# Patient Record
Sex: MALE | Race: WHITE | NOT HISPANIC OR LATINO | URBAN - METROPOLITAN AREA
[De-identification: names, ages, dates, MRNs, and addresses within clinical notes are randomized per-mention and may not be internally consistent; named-entity substitution may affect disease eponyms.]

---

## 2017-10-06 ENCOUNTER — APPOINTMENT (RX ONLY)
Dept: URBAN - METROPOLITAN AREA CLINIC 51 | Facility: CLINIC | Age: 47
Setting detail: DERMATOLOGY
End: 2017-10-06

## 2017-10-06 DIAGNOSIS — R21 RASH AND OTHER NONSPECIFIC SKIN ERUPTION: ICD-10-CM

## 2017-10-06 PROCEDURE — 99201: CPT

## 2017-10-06 PROCEDURE — ? COUNSELING

## 2017-10-06 PROCEDURE — ? TREATMENT REGIMEN

## 2017-10-06 PROCEDURE — ? ORDER TESTS

## 2017-10-06 ASSESSMENT — PAIN INTENSITY VAS: HOW INTENSE IS YOUR PAIN 0 BEING NO PAIN, 10 BEING THE MOST SEVERE PAIN POSSIBLE?: NO PAIN

## 2017-10-06 ASSESSMENT — SEVERITY ASSESSMENT: SEVERITY: CLEAR

## 2017-10-06 NOTE — PROCEDURE: ORDER TESTS
Lab Facility: [id number]
Expected Date Of Service: 10/06/2017
Bill For Surgical Tray: no
Performing Laboratory: [id number]
Billing Type: Third-Party Bill

## 2017-10-06 NOTE — HPI: [id number]
Is This A New Presentation, Or A Follow-Up?: Rash
Additional History: Patients doctor is currently trying to rule out psoriatic arthritis and his father did have lupus. He does not have the rash today, but he does have pictures. Heat makes the rash worse.

## 2017-10-06 NOTE — PROCEDURE: TREATMENT REGIMEN
Plan: Patient is clear today however patients pictures show light scale and redness mainly surrounding his joints and he does have chronic joint pain in his knees, ankles, spine and neck. We will check for Lupus since his dad had Lupus. He is to call as soon as his rash returns and we will work in so we can see and possibly biopsy rash. Rule out; Psoriasis v Lupus v GA. If psoriasis or GA will try Otezla or if it's Lupus we will discuss Plaquenil.
Detail Level: Zone